# Patient Record
Sex: MALE | Race: WHITE
[De-identification: names, ages, dates, MRNs, and addresses within clinical notes are randomized per-mention and may not be internally consistent; named-entity substitution may affect disease eponyms.]

---

## 2017-02-21 NOTE — REP
Clinical:  Trauma .

 

Technique:  AP, lateral, bilateral oblique, and coned-down views.

 

Findings:  Alignment and lordosis is maintained.  The vertebral bodies including

transverse process and spinous processes are intact and normal.  There is no

evidence for acute fracture / compression injury or subluxation.  No evidence for

spondylolysis or spondylolisthesis.  No significant degenerative change is

noted.

 

Impression:

Normal lumbosacral spine radiograph series.

 

 

Signed by

Arthur Mohr MD 02/21/2017 03:46 P

## 2017-02-21 NOTE — REP
Clinical:  Right-sided renal colic.

 

Findings:

Lung bases clear.  Visualized heart and pericardium normal.

Liver, spleen, pancreas, gallbladder, bilateral adrenal glands and kidneys are

normal.  There is no perinephric stranding, hydroureteronephrosis, intrarenal or

obstructing ureteral calculi.  The enteric system is without obstruction or acute

inflammatory process and a normal terminal ileum and appendix are identified in

the right lower quadrant.  2 cm fat containing periumbilical hernia noted.

Pelvis demonstrates normal bladder and age appropriate prostate/seminal vesicles.

No ascites.  No free air.  No adenopathy.  Abdominal aorta without aneurysm.

Musculoskeletal structures intact.

 

Impression:

Normal noncontrast CT of the abdomen and pelvis.

 

 

Signed by

Arthur Mohr MD 02/21/2017 04:54 P

## 2017-02-21 NOTE — EDDOCDS
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 abdominal; ultrasound.; ;      

      ; Signed by; Arthur Mohr MD 2017 06:00 P;                                          

Outcome:                                                                                          

18:18 Discharge ordered by Provider.                                                          mo1 

18:42 Discharge Assessment: Patient awake, alert and oriented x 3. No cognitive and/or        mk4 

      functional deficits noted. Patient verbalized understanding of disposition                  

      instructions. Patient awake and alert. Discharge Assessment: patient administered           

      narcotics - no. The following High Risk Discharge criteria are identified: None.            

      Condition: good Condition: stable. No special radiology studies were completed.             

      Property sent home with patient.                                                            

18:45 Patient left the ED.                                                                    mk4 

                                                                                                  

Signatures:                                                                                       

Dispatcher [employer]                           EDJosephine Contreras RN                      RN   [facility]                                                  

Daniella Vargas RN                    RN   Rafaela Medrano PA-C                      PA-C ef1                                                  

Geovanna Cherry RN                    RN   hs1                                                  

Oriana Quintero Michael, PA                    PA   mo1                                                  

Janell Shaver RN                      RN   mk4                                                  

Sophia Cutler                                amYumiko Greer RN                        RN   [id number]                                                 

Marlena Levin                                  lr2                                                  

                                                                                                  

Corrections: (The following items were deleted from the chart)                                    

16:48 16:44 General: Appears mk4                                                              mk4 

                                                                                                  

**************************************************************************************************

MTDD

## 2017-02-21 NOTE — EDDOCDS
Physician Documentation                                                                           

[facility]                                                                         

Name: Hermilo Jha                                                                                

Age: 36 yrs                                                                                       

Sex: Male                                                                                         

: 1980                                                                                   

MRN: [id number]                                                                                     

Arrival Date: 2017                                                                          

Time: 14:24                                                                                       

Account#: [id number]                                                                              

Bed I3 / M3                                                                                       

Private MD: NO PRIMARY PHYSICIAN, .                                                               

Disposition:                                                                                      

17 18:18 Discharged to Home/Self Care. Impression: Low back pain - Acute Right Flank        

Pain with Right sided abdominal pain..                                                          

- Condition is Stable.                                                                            

- Discharge Instructions: Back Pain, Adult, Musculoskeletal Pain.                                 

- Prescriptions for Norco 5- 325 mg Oral Tablet - take 1 tablet by ORAL route every 6             

hours As needed MDD: 4 tabs; 20 tablet. Robaxin 500 mg Oral Tablet - take 2 tablet by           

ORAL route every 6 hours As needed; 40 tablet.                                                  

- Medication Reconciliation, Local Pharmacy Hours form.                                           

- Follow up: Private Physician; When: 1 - 2 days; Reason: Recheck today's complaints,             

Continuance of care. Follow up: Emergency Department; Reason: Worsening of conditions.          

- Problem is new.                                                                                 

- Symptoms have improved.                                                                         

                                                                                                  

                                                                                                  

                                                                                                  

Historical:                                                                                       

- Allergies: Haldol (Anaphylaxis); Morphine (makes him mean);                                     

- Home Meds:                                                                                      

1. none                                                                                         

- PMHx: none;                                                                                     

- PSHx: bilateral ankle surgery; right wrist surgery; facial reconstruction surgery;              

- Social history: Smoking status: Electronic cigarettes No barriers to communication              

noted, The patient speaks fluent English.                                                       

- Family history: Not pertinent.                                                                  

- : The pt / caregiver states he / she is not on anticoagulants. Home medication list             

is obtained from the patient.                                                                   

- Exposure Risk Screening:: None identified.                                                      

                                                                                                  

                                                                                                  

Vital Signs:                                                                                      

                                                                                             

14:26  / 74; Pulse 109; Resp 18; Temp 96.8(O); Pulse Ox 97% on R/A; Weight 129.27 kg /  lr2 

      284.99 lbs (R); Height 6 ft. 1 in. (185.42 cm) (R); Pain 8/10;                              

18:37  / 71; Pulse 93; Resp 18; Temp 96.8; Pulse Ox 97% ; Pain 7/10;                    ajs 

14:26 Body Mass Index 37.60 (129.27 kg, 185.42 cm)                                            lr2 

                                                                                                  

MDM:                                                                                              

15:29 UA Ordered.                                                                             EDMS

15:30 Spine. Lumbosacral, Complete Ordered.                                                   EDMS

16:22 NS 0.9% 1000 ml IV at bolus once ordered.                                               ef1 

16:22 Ondansetron 4 mg IVP once ordered.                                                      ef1 

16:22 ketorolac 30 mg IVP once ordered.                                                       ef1 

16:22 IV Saline Lock ordered.                                                                 ef1 

16:22 Undress patient appropriately for examination ordered.                                  ef1 

16:22 Amylase Ordered.                                                                        EDMS

16:22 Basic Metabolic Profile Ordered.                                                        EDMS

16:22 CBC with Diff Ordered.                                                                  EDMS

16:22 Lipase Ordered.                                                                         EDMS

16:22 Liver Profile Ordered.                                                                  EDMS

16:23 CT ABD & PELVIS: No Contrast Ordered.                                                   EDMS

16:24 NOTHING BY MOUTH+DIET ordered.                                                          EDMS

16:46 UA Reviewed.                                                                            ef1 

16:46 Spine. Lumbosacral, Complete Reviewed.                                                  ef1 

17:23 Basic Metabolic Profile Reviewed.                                                       ef1 

17:23 CBC with Diff Reviewed.                                                                 ef1 

17:23 Lipase Reviewed.                                                                        ef1 

17:23 Amylase Reviewed.                                                                       ef1 

17:23 Liver Profile Reviewed.                                                                 ef1 

17:26 CT ABD & PELVIS: No Contrast Reviewed.                                                  ef1

17:27 Gallbladder US Ordered.                                                                 EDMS

17:36 Misc. Nursing Order ordered.                                                            ef1 

17:59 Metoclopramide 10 mg IV at 40 mg/hr once over 15 mins ordered.                          ef1 

                                                                                                  

Administered Medications:                                                                         

16:41 Drug: NS 0.9% 1000 ml [sodium chloride 0.9 % intravenous solution] Route: IV; Rate:     mk4 

      bolus; Site: left antecubital;                                                              

16:41 Drug: Ondansetron 4 mg Route: IVP; Site: left antecubital;                              mk4 

16:41 Drug: ketorolac 30 mg [ketorolac 30 mg/mL (1 mL) injection solution (1 mL)] Route: IVP; mk4 

      Site: left antecubital;                                                                     

17:00 Follow up: Response: No significant change.                                             mk4 

18:09 Drug: Metoclopramide 10 mg [metoclopramide 5 mg/mL injection solution] Route: IV; Rate: mk4 

      40 mg/hr; Infused Over: 15 mins; Site: left antecubital;                                    

18:36 Follow up: IV Status: Completed infusion                                                mk4 

18:44 Follow up: IV Status: Completed infusion                                                mk4 

                                                                                                  

                                                                                                  

Signatures:                                                                                       

Dispatcher [employer]                           Josephine Peoples RN                      RN   Rafaela De La Rosa PA-C PA-C ef1                                                  

O'Jania, Marin, PA                    PA   mo1                                                  

Janell Shaver, RN                      RN   mk4                                                  

                                                                                                  

**************************************************************************************************

MTDD

## 2017-02-21 NOTE — REP
Clinical: Biliary colic.

 

Technique:  Gray scale ultrasound using curved array transducer.

 

Findings:  The liver demonstrates fatty changes and pancreas is normal in

contour, size, and echogenicity without focal hepatic or pancreatic lesions

identified.  The gallbladder is normal without gallstones, wall thickening or

pericholecystic fluid.  No biliary ductal dilatation is appreciated, and the

common bile duct measures 5.6 mm diameter.  The right kidney is normal in

reniform shape without hydronephrosis and measures 13.1 x 4.7 x 4.3 cm.  No

ascites. Visualized portions of the abdominal aorta normal.

 

Impression:

Fatty liver.  Otherwise normal right upper quadrant and gallbladder abdominal

ultrasound.

 

 

Signed by

Arthur Mohr MD 02/21/2017 06:00 P

## 2017-02-23 NOTE — EDDOCDS
Physician Documentation                                                                           

[facility]                                                                         

Name: Hermilo Jha                                                                                

Age: 36 yrs                                                                                       

Sex: Male                                                                                         

: 1980                                                                                   

MRN: [id number]                                                                                     

Arrival Date: 2017                                                                          

Time: 14:24                                                                                       

Account#: [id number]                                                                              

Bed I3 / M3                                                                                       

Private MD: NO PRIMARY PHYSICIAN, .                                                               

Disposition:                                                                                      

17 18:18 Discharged to Home/Self Care. Impression: Low back pain - Acute Right Flank        

Pain with Right sided abdominal pain..                                                          

- Condition is Stable.                                                                            

- Discharge Instructions: Back Pain, Adult, Musculoskeletal Pain.                                 

- Prescriptions for Norco 5- 325 mg Oral Tablet - take 1 tablet by ORAL route every 6             

hours As needed MDD: 4 tabs; 20 tablet. Robaxin 500 mg Oral Tablet - take 2 tablet by           

ORAL route every 6 hours As needed; 40 tablet.                                                  

- Medication Reconciliation, Local Pharmacy Hours form.                                           

- Follow up: Private Physician; When: 1 - 2 days; Reason: Recheck today's complaints,             

Continuance of care. Follow up: Emergency Department; Reason: Worsening of conditions.          

- Problem is new.                                                                                 

- Symptoms have improved.                                                                         

                                                                                                  

                                                                                                  

                                                                                                  

Historical:                                                                                       

- Allergies: Haldol (Anaphylaxis); Morphine (makes him mean);                                     

- Home Meds:                                                                                      

1. none                                                                                         

- PMHx: none;                                                                                     

- PSHx: bilateral ankle surgery; right wrist surgery; facial reconstruction surgery;              

- Social history: Smoking status: Electronic cigarettes No barriers to communication              

noted, The patient speaks fluent English.                                                       

- Family history: Not pertinent.                                                                  

- : The pt / caregiver states he / she is not on anticoagulants. Home medication list             

is obtained from the patient.                                                                   

- Exposure Risk Screening:: None identified.                                                      

                                                                                                  

                                                                                                  

Vital Signs:                                                                                      

                                                                                             

14:26  / 74; Pulse 109; Resp 18; Temp 96.8(O); Pulse Ox 97% on R/A; Weight 129.27 kg /  lr2 

      284.99 lbs (R); Height 6 ft. 1 in. (185.42 cm) (R); Pain 8/10;                              

18:37  / 71; Pulse 93; Resp 18; Temp 96.8; Pulse Ox 97% ; Pain 7/10;                    ajs 

14:26 Body Mass Index 37.60 (129.27 kg, 185.42 cm)                                            lr2 

                                                                                                  

MDM:                                                                                              

15:29 UA Ordered.                                                                             EDMS

15:30 Spine. Lumbosacral, Complete Ordered.                                                   EDMS

16:22 NS 0.9% 1000 ml IV at bolus once ordered.                                               ef1 

16:22 Ondansetron 4 mg IVP once ordered.                                                      ef1 

16:22 ketorolac 30 mg IVP once ordered.                                                       ef1 

16:22 IV Saline Lock ordered.                                                                 ef1 

16:22 Undress patient appropriately for examination ordered.                                  ef1 

16:22 Amylase Ordered.                                                                        EDMS

16:22 Basic Metabolic Profile Ordered.                                                        EDMS

16:22 CBC with Diff Ordered.                                                                  EDMS

16:22 Lipase Ordered.                                                                         EDMS

16:22 Liver Profile Ordered.                                                                  EDMS

16:23 CT ABD & PELVIS: No Contrast Ordered.                                                   EDMS

16:24 NOTHING BY MOUTH+DIET ordered.                                                          EDMS

16:46 UA Reviewed.                                                                            ef1 

16:46 Spine. Lumbosacral, Complete Reviewed.                                                  ef1 

17:23 Basic Metabolic Profile Reviewed.                                                       ef1 

17:23 CBC with Diff Reviewed.                                                                 ef1 

17:23 Lipase Reviewed.                                                                        ef1 

17:23 Amylase Reviewed.                                                                       ef1 

17:23 Liver Profile Reviewed.                                                                 ef1 

17:26 CT ABD & PELVIS: No Contrast Reviewed.                                                  ef1

17:27 Gallbladder US Ordered.                                                                 EDMS

17:36 Misc. Nursing Order ordered.                                                            ef1 

17:59 Metoclopramide 10 mg IV at 40 mg/hr once over 15 mins ordered.                          ef1 

19:22 Financial registration complete.                                                        zo  

19:22 [facility] Payment Agreement was scanned into [employer] and attached to record.               zo  

02                                                                                             

12:01 T-Sheet-- Draft Copy was scanned into [employer] and attached to record.                   gb  

12:02 Radiology Report was scanned into [employer] and attached to record.                       gb  

                                                                                                  

Administered Medications:                                                                         

                                                                                             

16:41 Drug: NS 0.9% 1000 ml [sodium chloride 0.9 % intravenous solution] Route: IV; Rate:     mk4 

      bolus; Site: left antecubital;                                                              

16:41 Drug: Ondansetron 4 mg Route: IVP; Site: left antecubital;                              mk4 

16:41 Drug: ketorolac 30 mg [ketorolac 30 mg/mL (1 mL) injection solution (1 mL)] Route: IVP; mk4 

      Site: left antecubital;                                                                     

17:00 Follow up: Response: No significant change.                                             mk4 

18:09 Drug: Metoclopramide 10 mg [metoclopramide 5 mg/mL injection solution] Route: IV; Rate: mk4 

      40 mg/hr; Infused Over: 15 mins; Site: left antecubital;                                    

18:36 Follow up: IV Status: Completed infusion                                                mk4 

18:44 Follow up: IV Status: Completed infusion                                                mk4 

                                                                                                  

                                                                                                  

Signatures:                                                                                       

Dispatcher [employer]                           Josephine Peoples, RN                      RN   Charis Mcleod, Gary                  Reg  gb                                                   

Billie Hernadez Erica PAPEPE                      PAPEPE ef1                                                  

Marin Edyd PA                    PA   mo1                                                  

Janell Shaver RN                      RN   [email or web address]                                                  

                                                                                                  

The chart was reviewed and I authenticate all verbal orders and agree with the evaluation and 
treatment provided.Attachments:

: [facility] Payment Agreement                                                                zo  

                                                                                             

12:01 T-Sheet-- Draft Copy                                                                    gb  

                                                                                                  

**************************************************************************************************



*** Chart Complete ***
MTDD

## 2017-02-23 NOTE — EDDOCDS
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 normal without                

      gallstones, wall thickening or; pericholecystic fluid. No biliary ductal dilatation is      

      appreciated, and the; common bile duct measures 5.6 mm diameter. The right kidney is        

      normal in; reniform shape without hydronephrosis and measures 13.1 x 4.7 x 4.3 cm. No;      

      ascites. Visualized portions of the abdominal aorta normal.; ; Impression:; Fatty           

      liver. Otherwise normal right upper quadrant and gallbladder abdominal; ultrasound.; ;      

      ; Signed by; Arthur Mohr MD 2017 06:00 P;                                          

Outcome:                                                                                          

18:18 Discharge ordered by Provider.                                                          mo1 

18:42 Discharge Assessment: Patient awake, alert and oriented x 3. No cognitive and/or        mk4 

      functional deficits noted. Patient verbalized understanding of disposition                  

      instructions. Patient awake and alert. Discharge Assessment: patient administered           

      narcotics - no. The following High Risk Discharge criteria are identified: None.            

      Condition: good Condition: stable. No special radiology studies were completed.             

      Property sent home with patient.                                                            

18:45 Patient left the ED.                                                                    mk4 

                                                                                                  

Signatures:                                                                                       

Dispatcher [employer]                           EDMS                                                 

Josephine Sebastian RN                      RN   Daniella Reyes RN                    RN   srm                                                  

Charis Luna, Gary                  Reg  Billie Escobedo Erica, PA-C                      PA-C ef1                                                  

Geovanna Cherry RN                    RN   hs1                                                  

Oriana Quintero Michael, PA                    PA   mo1                                                  

Janell Shaver RN                      RN   mk4                                                  

Sophia Cutler Mallory, RN                        RN   [id number]                                                 

Marlena Levin                                  lr2                                                  

                                                                                                  

Corrections: (The following items were deleted from the chart)                                    

16:48 16:44 General: Appears mk4                                                              mk4 

                                                                                                  

**************************************************************************************************



*** Chart Complete ***
MTDD

## 2017-02-23 NOTE — EDDOCDS
Physician Documentation                                                                           

[facility]                                                                         

Name: Hermilo Jha                                                                                

Age: 36 yrs                                                                                       

Sex: Male                                                                                         

: 1980                                                                                   

MRN: [id number]                                                                                     

Arrival Date: 2017                                                                          

Time: 14:24                                                                                       

Account#: [id number]                                                                              

Bed I3 / M3                                                                                       

Private MD: NO PRIMARY PHYSICIAN, .                                                               

Disposition:                                                                                      

17 18:18 Discharged to Home/Self Care. Impression: Low back pain - Acute Right Flank        

Pain with Right sided abdominal pain..                                                          

- Condition is Stable.                                                                            

- Discharge Instructions: Back Pain, Adult, Musculoskeletal Pain.                                 

- Prescriptions for Norco 5- 325 mg Oral Tablet - take 1 tablet by ORAL route every 6             

hours As needed MDD: 4 tabs; 20 tablet. Robaxin 500 mg Oral Tablet - take 2 tablet by           

ORAL route every 6 hours As needed; 40 tablet.                                                  

- Medication Reconciliation, Local Pharmacy Hours form.                                           

- Follow up: Private Physician; When: 1 - 2 days; Reason: Recheck today's complaints,             

Continuance of care. Follow up: Emergency Department; Reason: Worsening of conditions.          

- Problem is new.                                                                                 

- Symptoms have improved.                                                                         

                                                                                                  

                                                                                                  

                                                                                                  

Historical:                                                                                       

- Allergies: Haldol (Anaphylaxis); Morphine (makes him mean);                                     

- Home Meds:                                                                                      

1. none                                                                                         

- PMHx: none;                                                                                     

- PSHx: bilateral ankle surgery; right wrist surgery; facial reconstruction surgery;              

- Social history: Smoking status: Electronic cigarettes No barriers to communication              

noted, The patient speaks fluent English.                                                       

- Family history: Not pertinent.                                                                  

- : The pt / caregiver states he / she is not on anticoagulants. Home medication list             

is obtained from the patient.                                                                   

- Exposure Risk Screening:: None identified.                                                      

                                                                                                  

                                                                                                  

Vital Signs:                                                                                      

                                                                                             

14:26  / 74; Pulse 109; Resp 18; Temp 96.8(O); Pulse Ox 97% on R/A; Weight 129.27 kg /  lr2 

      284.99 lbs (R); Height 6 ft. 1 in. (185.42 cm) (R); Pain 8/10;                              

18:37  / 71; Pulse 93; Resp 18; Temp 96.8; Pulse Ox 97% ; Pain 7/10;                    ajs 

14:26 Body Mass Index 37.60 (129.27 kg, 185.42 cm)                                            lr2 

                                                                                                  

MDM:                                                                                              

15:29 UA Ordered.                                                                             EDMS

15:30 Spine. Lumbosacral, Complete Ordered.                                                   EDMS

16:22 NS 0.9% 1000 ml IV at bolus once ordered.                                               ef1 

16:22 Ondansetron 4 mg IVP once ordered.                                                      ef1 

16:22 ketorolac 30 mg IVP once ordered.                                                       ef1 

16:22 IV Saline Lock ordered.                                                                 ef1 

16:22 Undress patient appropriately for examination ordered.                                  ef1 

16:22 Amylase Ordered.                                                                        EDMS

16:22 Basic Metabolic Profile Ordered.                                                        EDMS

16:22 CBC with Diff Ordered.                                                                  EDMS

16:22 Lipase Ordered.                                                                         EDMS

16:22 Liver Profile Ordered.                                                                  EDMS

16:23 CT ABD & PELVIS: No Contrast Ordered.                                                   EDMS

16:24 NOTHING BY MOUTH+DIET ordered.                                                          EDMS

16:46 UA Reviewed.                                                                            ef1 

16:46 Spine. Lumbosacral, Complete Reviewed.                                                  ef1 

17:23 Basic Metabolic Profile Reviewed.                                                       ef1 

17:23 CBC with Diff Reviewed.                                                                 ef1 

17:23 Lipase Reviewed.                                                                        ef1 

17:23 Amylase Reviewed.                                                                       ef1 

17:23 Liver Profile Reviewed.                                                                 ef1 

17:26 CT ABD & PELVIS: No Contrast Reviewed.                                                  ef1

17:27 Gallbladder US Ordered.                                                                 EDMS

17:36 Misc. Nursing Order ordered.                                                            ef1 

17:59 Metoclopramide 10 mg IV at 40 mg/hr once over 15 mins ordered.                          ef1 

19:22 Financial registration complete.                                                        zo  

19:22 [facility] Payment Agreement was scanned into [employer] and attached to record.               zo  

02                                                                                             

12:01 T-Sheet-- Draft Copy was scanned into [employer] and attached to record.                   gb  

12:02 Radiology Report was scanned into [employer] and attached to record.                       gb  

                                                                                                  

Administered Medications:                                                                         

                                                                                             

16:41 Drug: NS 0.9% 1000 ml [sodium chloride 0.9 % intravenous solution] Route: IV; Rate:     mk4 

      bolus; Site: left antecubital;                                                              

16:41 Drug: Ondansetron 4 mg Route: IVP; Site: left antecubital;                              mk4 

16:41 Drug: ketorolac 30 mg [ketorolac 30 mg/mL (1 mL) injection solution (1 mL)] Route: IVP; mk4 

      Site: left antecubital;                                                                     

17:00 Follow up: Response: No significant change.                                             mk4 

18:09 Drug: Metoclopramide 10 mg [metoclopramide 5 mg/mL injection solution] Route: IV; Rate: mk4 

      40 mg/hr; Infused Over: 15 mins; Site: left antecubital;                                    

18:36 Follow up: IV Status: Completed infusion                                                mk4 

18:44 Follow up: IV Status: Completed infusion                                                mk4 

                                                                                                  

                                                                                                  

Signatures:                                                                                       

Dispatcher [employer]                           Josephine Peoples, RN                      RN   Charis Mcleod, Gary                  Reg  gb                                                   

Billie Hernadez Erica PAPEPE                      PAPEPE ef1                                                  

Marin Eddy PA                    PA   mo1                                                  

Janell Shaver RN                      RN   [id number]                                                  

                                                                                                  

The chart was reviewed and I authenticate all verbal orders and agree with the evaluation and 
treatment provided.Attachments:

: [facility] Payment Agreement                                                                zo  

                                                                                             

12:01 T-Sheet-- Draft Copy                                                                    gb  

                                                                                                  

**************************************************************************************************



*** Chart Complete ***
MTDD

## 2018-02-13 ENCOUNTER — HOSPITAL ENCOUNTER (OUTPATIENT)
Dept: HOSPITAL 53 - M SLEEP | Age: 38
End: 2018-02-13
Attending: INTERNAL MEDICINE
Payer: COMMERCIAL

## 2018-02-13 DIAGNOSIS — G47.33: Primary | ICD-10-CM

## 2018-02-13 DIAGNOSIS — G47.61: ICD-10-CM

## 2018-02-13 PROCEDURE — 95810 POLYSOM 6/> YRS 4/> PARAM: CPT

## 2018-03-06 ENCOUNTER — HOSPITAL ENCOUNTER (OUTPATIENT)
Dept: HOSPITAL 53 - M SLEEP | Age: 38
End: 2018-03-06
Attending: INTERNAL MEDICINE
Payer: COMMERCIAL

## 2018-03-06 DIAGNOSIS — G47.33: Primary | ICD-10-CM

## 2018-03-06 PROCEDURE — 95811 POLYSOM 6/>YRS CPAP 4/> PARM: CPT

## 2018-03-29 ENCOUNTER — HOSPITAL ENCOUNTER (OUTPATIENT)
Dept: HOSPITAL 53 - M SFHCCLAY | Age: 38
End: 2018-03-29
Attending: FAMILY MEDICINE
Payer: COMMERCIAL

## 2018-03-29 DIAGNOSIS — E78.2: Primary | ICD-10-CM

## 2018-03-29 LAB
ALBUMIN/GLOBULIN RATIO: 1.14 (ref 1–1.93)
ALBUMIN: 4 GM/DL (ref 3.2–5.2)
ALKALINE PHOSPHATASE: 90 U/L (ref 45–117)
ALT SERPL W P-5'-P-CCNC: 66 U/L (ref 12–78)
ANION GAP: 6 MEQ/L (ref 8–16)
AST SERPL-CCNC: 20 U/L (ref 7–37)
BILIRUBIN,TOTAL: 0.6 MG/DL (ref 0.2–1)
BLOOD UREA NITROGEN: 19 MG/DL (ref 7–18)
CALCIUM LEVEL: 9.2 MG/DL (ref 8.5–10.1)
CARBON DIOXIDE LEVEL: 28 MEQ/L (ref 21–32)
CHLORIDE LEVEL: 109 MEQ/L (ref 98–107)
CHOLEST SERPL-MCNC: 235 MG/DL (ref ?–200)
CHOLESTEROL RISK RATIO: 8.39 (ref ?–5)
CREATININE FOR GFR: 0.96 MG/DL (ref 0.7–1.3)
GFR SERPL CREATININE-BSD FRML MDRD: > 60 ML/MIN/{1.73_M2} (ref 60–?)
GLUCOSE, FASTING: 111 MG/DL (ref 70–100)
HDLC SERPL-MCNC: 28 MG/DL (ref 40–?)
NONHDLC SERPL-MCNC: 207 MG/DL
POTASSIUM SERUM: 4.5 MEQ/L (ref 3.5–5.1)
SODIUM LEVEL: 143 MEQ/L (ref 136–145)
TOTAL PROTEIN: 7.5 GM/DL (ref 6.4–8.2)
TRIGLYCERIDES LEVEL: 572 MG/DL (ref ?–150)

## 2019-04-10 ENCOUNTER — HOSPITAL ENCOUNTER (OUTPATIENT)
Dept: HOSPITAL 53 - M SMT | Age: 39
End: 2019-04-10
Attending: UROLOGY
Payer: COMMERCIAL

## 2019-04-10 DIAGNOSIS — N48.9: Primary | ICD-10-CM

## 2021-07-02 ENCOUNTER — HOSPITAL ENCOUNTER (OUTPATIENT)
Dept: HOSPITAL 53 - M ED | Age: 41
Setting detail: OBSERVATION
LOS: 1 days | Discharge: HOME | End: 2021-07-03
Attending: SURGERY | Admitting: SURGERY
Payer: COMMERCIAL

## 2021-07-02 VITALS — DIASTOLIC BLOOD PRESSURE: 80 MMHG | SYSTOLIC BLOOD PRESSURE: 135 MMHG

## 2021-07-02 VITALS — BODY MASS INDEX: 33.48 KG/M2 | HEIGHT: 73 IN | WEIGHT: 252.65 LBS

## 2021-07-02 DIAGNOSIS — K21.9: ICD-10-CM

## 2021-07-02 DIAGNOSIS — G47.33: ICD-10-CM

## 2021-07-02 DIAGNOSIS — Z88.8: ICD-10-CM

## 2021-07-02 DIAGNOSIS — K80.00: Primary | ICD-10-CM

## 2021-07-02 DIAGNOSIS — M51.36: ICD-10-CM

## 2021-07-02 DIAGNOSIS — F32.9: ICD-10-CM

## 2021-07-02 DIAGNOSIS — Z79.899: ICD-10-CM

## 2021-07-02 DIAGNOSIS — G89.29: ICD-10-CM

## 2021-07-02 DIAGNOSIS — F41.9: ICD-10-CM

## 2021-07-02 LAB
ALBUMIN SERPL BCG-MCNC: 4 GM/DL (ref 3.2–5.2)
ALT SERPL W P-5'-P-CCNC: 51 U/L (ref 12–78)
BASOPHILS # BLD AUTO: 0.1 10^3/UL (ref 0–0.2)
BASOPHILS NFR BLD AUTO: 0.5 % (ref 0–1)
BILIRUB CONJ SERPL-MCNC: < 0.1 MG/DL (ref 0–0.2)
BILIRUB SERPL-MCNC: 0.2 MG/DL (ref 0.2–1)
BUN SERPL-MCNC: 19 MG/DL (ref 7–18)
CALCIUM SERPL-MCNC: 9.5 MG/DL (ref 8.5–10.1)
CHLORIDE SERPL-SCNC: 109 MEQ/L (ref 98–107)
CO2 SERPL-SCNC: 26 MEQ/L (ref 21–32)
CREAT SERPL-MCNC: 0.93 MG/DL (ref 0.7–1.3)
EOSINOPHIL # BLD AUTO: 0.2 10^3/UL (ref 0–0.5)
EOSINOPHIL NFR BLD AUTO: 1.3 % (ref 0–3)
GFR SERPL CREATININE-BSD FRML MDRD: > 60 ML/MIN/{1.73_M2} (ref 60–?)
GLUCOSE SERPL-MCNC: 131 MG/DL (ref 70–100)
HCT VFR BLD AUTO: 42.9 % (ref 42–52)
HGB BLD-MCNC: 14.1 G/DL (ref 13.5–17.5)
LIPASE SERPL-CCNC: 228 U/L (ref 73–393)
LYMPHOCYTES # BLD AUTO: 2 10^3/UL (ref 1.5–5)
LYMPHOCYTES NFR BLD AUTO: 17.3 % (ref 24–44)
MCH RBC QN AUTO: 28 PG (ref 27–33)
MCHC RBC AUTO-ENTMCNC: 32.9 G/DL (ref 32–36.5)
MCV RBC AUTO: 85.1 FL (ref 80–96)
MONOCYTES # BLD AUTO: 0.9 10^3/UL (ref 0–0.8)
MONOCYTES NFR BLD AUTO: 7.8 % (ref 2–8)
NEUTROPHILS # BLD AUTO: 8.5 10^3/UL (ref 1.5–8.5)
NEUTROPHILS NFR BLD AUTO: 72.8 % (ref 36–66)
PLATELET # BLD AUTO: 260 10^3/UL (ref 150–450)
POTASSIUM SERPL-SCNC: 4 MEQ/L (ref 3.5–5.1)
PROT SERPL-MCNC: 7.3 GM/DL (ref 6.4–8.2)
RBC # BLD AUTO: 5.04 10^6/UL (ref 4.3–6.1)
RSV RNA NPH QL NAA+PROBE: NEGATIVE
SODIUM SERPL-SCNC: 144 MEQ/L (ref 136–145)
WBC # BLD AUTO: 11.7 10^3/UL (ref 4–10)

## 2021-07-02 PROCEDURE — 96375 TX/PRO/DX INJ NEW DRUG ADDON: CPT

## 2021-07-02 PROCEDURE — 76705 ECHO EXAM OF ABDOMEN: CPT

## 2021-07-02 PROCEDURE — 96361 HYDRATE IV INFUSION ADD-ON: CPT

## 2021-07-02 PROCEDURE — 85027 COMPLETE CBC AUTOMATED: CPT

## 2021-07-02 PROCEDURE — 83690 ASSAY OF LIPASE: CPT

## 2021-07-02 PROCEDURE — 96365 THER/PROPH/DIAG IV INF INIT: CPT

## 2021-07-02 PROCEDURE — 80076 HEPATIC FUNCTION PANEL: CPT

## 2021-07-02 PROCEDURE — 87631 RESP VIRUS 3-5 TARGETS: CPT

## 2021-07-02 PROCEDURE — 80048 BASIC METABOLIC PNL TOTAL CA: CPT

## 2021-07-02 PROCEDURE — 36415 COLL VENOUS BLD VENIPUNCTURE: CPT

## 2021-07-02 PROCEDURE — 96366 THER/PROPH/DIAG IV INF ADDON: CPT

## 2021-07-02 PROCEDURE — 85025 COMPLETE CBC W/AUTO DIFF WBC: CPT

## 2021-07-02 PROCEDURE — 96376 TX/PRO/DX INJ SAME DRUG ADON: CPT

## 2021-07-02 PROCEDURE — 99285 EMERGENCY DEPT VISIT HI MDM: CPT

## 2021-07-02 PROCEDURE — 80053 COMPREHEN METABOLIC PANEL: CPT

## 2021-07-02 RX ADMIN — DEXTROSE MONOHYDRATE SCH MLS/HR: 5 INJECTION INTRAVENOUS at 21:43

## 2021-07-02 RX ADMIN — SODIUM CHLORIDE SCH MLS/HR: 9 INJECTION, SOLUTION INTRAVENOUS at 18:53

## 2021-07-02 RX ADMIN — SODIUM CHLORIDE SCH MLS/HR: 9 INJECTION, SOLUTION INTRAVENOUS at 21:44

## 2021-07-02 RX ADMIN — DEXTROSE MONOHYDRATE SCH MLS/HR: 5 INJECTION INTRAVENOUS at 16:27

## 2021-07-02 RX ADMIN — HYDROCODONE BITARTRATE AND ACETAMINOPHEN PRN TAB: 5; 325 TABLET ORAL at 21:51

## 2021-07-02 RX ADMIN — KETOROLAC TROMETHAMINE PRN MG: 30 INJECTION, SOLUTION INTRAMUSCULAR at 18:53

## 2021-07-02 NOTE — REP
INDICATION:

biliary colic



COMPARISON:

None.



TECHNIQUE:

Real time gray scale ultrasound examination using curved array transducer.



FINDINGS:

Evaluation is significantly limited due to body habitus and overlying bowel gas which

significantly limit sonographic window.



The gallbladder demonstrates wall thickening and multiple mobile gallstones along with

a sonographic Swann's sign.  The common bile duct could not be identified due to

overlying bowel gas.



The liver is enlarged and measures 22 cm in craniocaudal length without obvious focal

hepatic lesion appreciated.  The pancreas is incompletely visualized.



The right kidney is normal in reniform shape without hydronephrosis and measures 12.5

x 6.8 x 5.7 cm.



No ascites in the visualized right upper quadrant.







IMPRESSION:

Findings related to the gallbladder as described above.  Evaluation is limited.

Biliary colic versus early acute cholecystitis is suspected and close clinical

observation is warranted.  Consider CT of the abdomen and pelvis if necessary.





<Electronically signed by Arthur Mohr > 07/02/21 0932

## 2021-07-03 VITALS — SYSTOLIC BLOOD PRESSURE: 142 MMHG | DIASTOLIC BLOOD PRESSURE: 81 MMHG

## 2021-07-03 LAB
ALBUMIN SERPL BCG-MCNC: 3.6 GM/DL (ref 3.2–5.2)
ALT SERPL W P-5'-P-CCNC: 43 U/L (ref 12–78)
BILIRUB SERPL-MCNC: 0.7 MG/DL (ref 0.2–1)
BUN SERPL-MCNC: 11 MG/DL (ref 7–18)
CALCIUM SERPL-MCNC: 8.8 MG/DL (ref 8.5–10.1)
CHLORIDE SERPL-SCNC: 109 MEQ/L (ref 98–107)
CO2 SERPL-SCNC: 25 MEQ/L (ref 21–32)
CREAT SERPL-MCNC: 0.83 MG/DL (ref 0.7–1.3)
GFR SERPL CREATININE-BSD FRML MDRD: > 60 ML/MIN/{1.73_M2} (ref 60–?)
GLUCOSE SERPL-MCNC: 121 MG/DL (ref 70–100)
HCT VFR BLD AUTO: 40.2 % (ref 42–52)
HGB BLD-MCNC: 13.2 G/DL (ref 13.5–17.5)
MCH RBC QN AUTO: 27.8 PG (ref 27–33)
MCHC RBC AUTO-ENTMCNC: 32.8 G/DL (ref 32–36.5)
MCV RBC AUTO: 84.6 FL (ref 80–96)
PLATELET # BLD AUTO: 226 10^3/UL (ref 150–450)
POTASSIUM SERPL-SCNC: 3.6 MEQ/L (ref 3.5–5.1)
PROT SERPL-MCNC: 6.9 GM/DL (ref 6.4–8.2)
RBC # BLD AUTO: 4.75 10^6/UL (ref 4.3–6.1)
SODIUM SERPL-SCNC: 142 MEQ/L (ref 136–145)
WBC # BLD AUTO: 11.3 10^3/UL (ref 4–10)

## 2021-07-03 RX ADMIN — HYDROCODONE BITARTRATE AND ACETAMINOPHEN PRN TAB: 5; 325 TABLET ORAL at 04:19

## 2021-07-03 RX ADMIN — DEXTROSE MONOHYDRATE SCH MLS/HR: 5 INJECTION INTRAVENOUS at 04:19

## 2021-07-03 RX ADMIN — KETOROLAC TROMETHAMINE PRN MG: 30 INJECTION, SOLUTION INTRAMUSCULAR at 08:52

## 2021-07-03 RX ADMIN — SODIUM CHLORIDE SCH MLS/HR: 9 INJECTION, SOLUTION INTRAVENOUS at 08:52
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
## 2021-09-27 ENCOUNTER — HOSPITAL ENCOUNTER (OUTPATIENT)
Dept: HOSPITAL 53 - M LABSMTC | Age: 41
End: 2021-09-27
Attending: ANESTHESIOLOGY
Payer: COMMERCIAL

## 2021-09-27 DIAGNOSIS — Z01.812: Primary | ICD-10-CM

## 2021-09-27 DIAGNOSIS — Z20.822: ICD-10-CM

## 2021-10-01 ENCOUNTER — HOSPITAL ENCOUNTER (OUTPATIENT)
Dept: HOSPITAL 53 - M SDC | Age: 41
Discharge: HOME | End: 2021-10-01
Attending: SURGERY
Payer: COMMERCIAL

## 2021-10-01 VITALS — WEIGHT: 307.8 LBS | BODY MASS INDEX: 40.79 KG/M2 | HEIGHT: 73 IN

## 2021-10-01 VITALS — DIASTOLIC BLOOD PRESSURE: 93 MMHG | SYSTOLIC BLOOD PRESSURE: 154 MMHG

## 2021-10-01 DIAGNOSIS — Z88.8: ICD-10-CM

## 2021-10-01 DIAGNOSIS — E78.00: ICD-10-CM

## 2021-10-01 DIAGNOSIS — Z79.899: ICD-10-CM

## 2021-10-01 DIAGNOSIS — M19.90: ICD-10-CM

## 2021-10-01 DIAGNOSIS — G47.30: ICD-10-CM

## 2021-10-01 DIAGNOSIS — F41.9: ICD-10-CM

## 2021-10-01 DIAGNOSIS — Z87.19: ICD-10-CM

## 2021-10-01 DIAGNOSIS — Z87.891: ICD-10-CM

## 2021-10-01 DIAGNOSIS — F32.9: ICD-10-CM

## 2021-10-01 DIAGNOSIS — Z91.013: ICD-10-CM

## 2021-10-01 DIAGNOSIS — K42.9: Primary | ICD-10-CM

## 2021-10-01 PROCEDURE — 49653: CPT

## 2021-10-01 RX ADMIN — FENTANYL CITRATE PRN MCG: 50 INJECTION, SOLUTION INTRAMUSCULAR; INTRAVENOUS at 14:36

## 2021-10-01 RX ADMIN — FENTANYL CITRATE PRN MCG: 50 INJECTION, SOLUTION INTRAMUSCULAR; INTRAVENOUS at 14:23

## 2021-10-01 RX ADMIN — FENTANYL CITRATE PRN MCG: 50 INJECTION, SOLUTION INTRAMUSCULAR; INTRAVENOUS at 14:49

## 2021-10-01 RX ADMIN — FENTANYL CITRATE PRN MCG: 50 INJECTION, SOLUTION INTRAMUSCULAR; INTRAVENOUS at 14:56

## 2021-10-01 NOTE — RO
OPERATIVE NOTE



DATE OF OPERATION: 10/01/2021



PREOPERATIVE DIAGNOSIS: Incarcerated umbilical hernia.



POSTOPERATIVE DIAGNOSIS: Incarcerated umbilical hernia.



PROCEDURE: Robotic incarcerated umbilical hernia repair.



SURGEON: Dr. Yobani Winter



ASSISTANT: Lizbeth Anguiano



ANESTHESIA: General.



ESTIMATED BLOOD LOSS: 5.



COMPLICATIONS: None.



INDICATIONS FOR PROCEDURE: Patient is a 41-year-old male who presents with a

large, nonreducible umbilical hernia. Recommendation was to proceed with

robotic repair. Risks and benefits of procedure, not limited to, but including,

bleeding, infection, hernia, damage to surrounding structures, and need for

further surgery were discussed in detail with the patient. Informed consent was

obtained and procedure was planned.



DESCRIPTION OF PROCEDURE: Patient was brought back to operating room 7. After

sufficient sedation, the abdomen was sterilely prepped and draped. Next, a

time-out was done to confirm proper patient, proper procedure. Following that,

an 8 mm incision was made in the left upper quadrant, Veress needle inserted,

and the abdomen was insufflated to 15 mmHg. Veress needle was then removed, and

an 8 mm OptiView port was used to gain access to the abdomen. Once the abdomen

was entered, there was omentum identified stuck up within the hernia sac. Two

more ports were placed, one in the midline just left of the xiphoid, and one in

the right upper quadrant. The robot was then docked to the ports. Next, from

the inside the abdomen the hernia contents were carefully reduced using some

blunt and sharp dissection. Once it was completely reduced, I was able to make

a small horizontal incision to the preperitoneal space just proximal to the

umbilicus and the midline. The preperitoneal space was then dissected free

circumferentially around the hernia defect, and the entire hernia sac was

completely reduced. Once that was completed, the defect was closed with a

running 0 Stratafix suture. Once that was completed, a piece of 3 x 7 cm

ProGrip mesh was placed over top of the sutured defect. Peritoneum was then

closed over top of this with a running 3-0 V-Loc suture. Once that was

completed, the abdomen was desufflated. Skin incisions were closed with 4-0

Vicryl subcuticular sutures. The abdomen was cleaned and dried. Steri-Strips, 4

x 4, and tape were applied, thus ending procedure.

## 2025-07-22 ENCOUNTER — HOSPITAL ENCOUNTER (OUTPATIENT)
Dept: HOSPITAL 53 - M OUTALCOH | Age: 45
LOS: 9 days | End: 2025-07-31
Attending: PSYCHIATRY & NEUROLOGY
Payer: SELF-PAY

## 2025-07-22 DIAGNOSIS — F14.10: Primary | ICD-10-CM

## 2025-07-22 DIAGNOSIS — F17.200: ICD-10-CM

## 2025-07-22 DIAGNOSIS — F10.10: ICD-10-CM
